# Patient Record
Sex: FEMALE | URBAN - METROPOLITAN AREA
[De-identification: names, ages, dates, MRNs, and addresses within clinical notes are randomized per-mention and may not be internally consistent; named-entity substitution may affect disease eponyms.]

---

## 2024-09-11 ENCOUNTER — TRANSCRIBE ORDERS (OUTPATIENT)
Dept: ADMINISTRATIVE | Facility: HOSPITAL | Age: 54
End: 2024-09-11

## 2024-09-11 DIAGNOSIS — E78.2 MIXED HYPERLIPIDEMIA: Primary | ICD-10-CM

## 2024-10-29 ENCOUNTER — HOSPITAL ENCOUNTER (OUTPATIENT)
Dept: CT IMAGING | Facility: HOSPITAL | Age: 54
Discharge: HOME | End: 2024-10-29

## 2024-10-29 DIAGNOSIS — E78.2 MIXED HYPERLIPIDEMIA: ICD-10-CM

## 2024-10-29 PROCEDURE — 75571 CT HRT W/O DYE W/CA TEST: CPT

## 2025-07-14 ENCOUNTER — OFFICE VISIT (OUTPATIENT)
Dept: GASTROENTEROLOGY | Facility: CLINIC | Age: 55
End: 2025-07-14
Payer: OTHER GOVERNMENT

## 2025-07-14 ENCOUNTER — LAB (OUTPATIENT)
Facility: HOSPITAL | Age: 55
End: 2025-07-14
Payer: OTHER GOVERNMENT

## 2025-07-14 VITALS
DIASTOLIC BLOOD PRESSURE: 76 MMHG | WEIGHT: 186.6 LBS | HEART RATE: 81 BPM | SYSTOLIC BLOOD PRESSURE: 114 MMHG | BODY MASS INDEX: 33.06 KG/M2 | HEIGHT: 63 IN

## 2025-07-14 DIAGNOSIS — K21.9 GASTROESOPHAGEAL REFLUX DISEASE, UNSPECIFIED WHETHER ESOPHAGITIS PRESENT: ICD-10-CM

## 2025-07-14 DIAGNOSIS — K90.0 CELIAC DISEASE: Primary | ICD-10-CM

## 2025-07-14 DIAGNOSIS — K90.0 CELIAC DISEASE: ICD-10-CM

## 2025-07-14 DIAGNOSIS — Z90.49 HISTORY OF COLON RESECTION: ICD-10-CM

## 2025-07-14 LAB
IRON 24H UR-MRATE: 89 MCG/DL (ref 37–145)
IRON SATN MFR SERPL: 22 % (ref 20–50)
TIBC SERPL-MCNC: 402 MCG/DL (ref 298–536)
TRANSFERRIN SERPL-MCNC: 270 MG/DL (ref 200–360)

## 2025-07-14 PROCEDURE — 82784 ASSAY IGA/IGD/IGG/IGM EACH: CPT

## 2025-07-14 PROCEDURE — 86364 TISS TRNSGLTMNASE EA IG CLAS: CPT

## 2025-07-14 PROCEDURE — 86258 DGP ANTIBODY EACH IG CLASS: CPT

## 2025-07-14 PROCEDURE — 83540 ASSAY OF IRON: CPT

## 2025-07-14 PROCEDURE — 36415 COLL VENOUS BLD VENIPUNCTURE: CPT

## 2025-07-14 PROCEDURE — 84466 ASSAY OF TRANSFERRIN: CPT

## 2025-07-14 RX ORDER — PANTOPRAZOLE SODIUM 40 MG/1
TABLET, DELAYED RELEASE ORAL
COMMUNITY
Start: 2020-01-01

## 2025-07-14 RX ORDER — ERGOCALCIFEROL 1.25 MG/1
1 CAPSULE, LIQUID FILLED ORAL WEEKLY
COMMUNITY
Start: 2025-04-28

## 2025-07-14 RX ORDER — DICYCLOMINE HCL 20 MG
TABLET ORAL
COMMUNITY
Start: 2025-04-21

## 2025-07-14 RX ORDER — ROSUVASTATIN CALCIUM 5 MG
TABLET ORAL
COMMUNITY
Start: 2025-04-21

## 2025-07-14 RX ORDER — CETIRIZINE HYDROCHLORIDE 10 MG/1
TABLET ORAL
COMMUNITY
Start: 2020-01-01

## 2025-07-14 RX ORDER — FAMOTIDINE 40 MG/1
1 TABLET, FILM COATED ORAL DAILY
COMMUNITY
Start: 2025-04-18

## 2025-07-14 NOTE — PROGRESS NOTES
Chief Complaint     Celiac Disease, Diverticulitis, and Heartburn    Patient or patient representative verbalized consent for the use of Ambient Listening during the visit with  ALVARO Chanel for chart documentation. 7/14/2025  13:08 EDT    History of Present Illness     History of Present Illness  The patient presents for evaluation of celiac disease, acid reflux, diverticulitis, and vitamin D deficiency.    She has been adhering to a gluten-free diet since 2004 due to her celiac disease. Despite following this diet, she has experienced malabsorption issues and is currently on a regimen of 50,000 units of vitamin D once a week. Her vitamin D levels did not increase during the last check, so her doctor adjusted the dosage. She is scheduled for a follow-up in 08/2025 to recheck her vitamin D levels. She does not take a multivitamin.    She has a history of acid reflux and underwent surgery to remove tissue from her throat that was compromising her airway. She takes pantoprazole and famotidine regularly and reports no current blood in her stool. She has consulted an ENT specialist and a gastroenterologist for her condition. She has not had a throat MRI recently. She has a history of H. pylori infection but believes it is currently resolved. She also has a hiatal hernia. She avoids gluten, salt, and fried foods. She occasionally experiences heartburn.  She does not take famotidine every night but takes 40 mg of Protonix in the morning and famotidine as needed based on her diet. She tries to avoid eating late at night. She was informed that Crestor, a cholesterol medication she started taking, could exacerbate her symptoms. Her doctor is monitoring her Crestor use and will decide in 08/2025 whether she should continue with it. Her last upper endoscopy was in 2019.    Her last colonoscopy was in 2021, following which she underwent colon surgery within a month. She has had five colonoscopies before the age of  50, none of which revealed suspicious polyps. However, she has been dealing with diverticulitis. In 01/2021, she experienced her first documented episode of diverticulitis, followed by sepsis in 04/2021. In 06/2021, she had 15 inches of her sigmoid colon removed. Despite recovering from sepsis, she was hospitalized again in 09/2021 due to another flare-up. Her most recent episode of diverticulitis occurred in 12/2023. She suspects that her celiac disease may be interfering with the effectiveness of oral antibiotics, as they often need to switch to IV treatment. She now follows a different protocol where she switches to a liquid diet at the onset of any symptoms, which seems to be helping. She reports regular bowel movements outside of these episodes. She was prescribed Bentyl, which she initially took twice daily for about two weeks, but now only takes as needed, sometimes less than once a week or even every two weeks.             History      Past Medical History:   Diagnosis Date    Celiac disease     Cholelithiasis     Diverticulitis     Diverticulitis of colon     GERD (gastroesophageal reflux disease)     Hyperlipidemia     Lactose intolerance        Past Surgical History:   Procedure Laterality Date    ABDOMINAL SURGERY  06/2021    Sigmoid Colon Resection    CHOLECYSTECTOMY  2001    COLON RESECTION      COLONOSCOPY  2015, 2018, 2021    UPPER GASTROINTESTINAL ENDOSCOPY  2001, 2019       Family History   Problem Relation Age of Onset    Stomach cancer Paternal Grandmother         Current Medications        Current Outpatient Medications:     Carboxymethylcellulose Sodium (THERATEARS EXTRA PF OP), , Disp: , Rfl:     cetirizine (zyrTEC) 10 MG tablet, , Disp: , Rfl:     Crestor 5 MG tablet, , Disp: , Rfl:     dicyclomine (BENTYL) 20 MG tablet, , Disp: , Rfl:     famotidine (PEPCID) 40 MG tablet, Take 1 tablet by mouth Daily., Disp: , Rfl:     pantoprazole (PROTONIX) 40 MG EC tablet, , Disp: , Rfl:     vitamin D  "(ERGOCALCIFEROL) 1.25 MG (40579 UT) capsule capsule, Take 1 capsule by mouth 1 (One) Time Per Week., Disp: , Rfl:      Allergies     Allergies   Allergen Reactions    Ciprofloxacin Nausea Only     Also passes out     Nsaids Other (See Comments)     Cannot take due to GI bleed     Sulfa Antibiotics Nausea Only    Zosyn [Piperacillin-Tazobactam In Dex] Unknown - High Severity     Convulsions        Social History       Social History     Social History Narrative    Not on file       Immunizations     Immunization:    There is no immunization history on file for this patient.       Objective     Objective     Vital Signs:   /76 (BP Location: Left arm, Patient Position: Sitting, Cuff Size: Adult)   Pulse 81   Ht 160 cm (63\")   Wt 84.6 kg (186 lb 9.6 oz)   BMI 33.05 kg/m²       Physical Exam    Results      Result Review :   The following data was reviewed by: ALVARO Chanel on 07/14/2025:            Results  Labs   - ALT: 02/26/2025, 29 U/L   - AST: 02/26/2025, 26 U/L   - Total Bilirubin: 02/26/2025, 0.2 mg/dL           Assessment and Plan        Assessment and Plan    Diagnoses and all orders for this visit:    1. Celiac disease (Primary)  -     Celiac Panel Reflex To Titer; Future  -     Iron Profile w/o Ferritin; Future    2. Gastroesophageal reflux disease, unspecified whether esophagitis present    3. History of colon resection        Assessment & Plan  1. Celiac Disease:  - Strict gluten-free diet since 2004.  - Order celiac panel to monitor condition.  - Check iron levels for proper absorption.    2. Gastroesophageal Reflux Disease (GERD):  - Pantoprazole 40 mg in the morning.  - Famotidine at night as needed.  - If reflux symptoms do not improve within the next month, schedule repeat upper endoscopy.    3. Diverticulitis:  - Recurrent episodes in 2021 and last episode in 12/2023.  - Follow liquid diet at onset of symptoms.    4. Vitamin D Deficiency:  - 50,000 units of vitamin D once a " week.  - Recheck vitamin D levels in 08/2025 to assess treatment effectiveness.    5. Health Maintenance:  - Last colonoscopy in 2021.  - Review previous colonoscopy results to determine timing for repeat procedure.      Records requested of previous EGD and Colonoscopy.       Follow Up        Follow Up   Return in about 6 months (around 1/14/2026) for GERD.  Patient was given instructions and counseling regarding her condition or for health maintenance advice. Please see specific information pulled into the AVS if appropriate.

## 2025-07-15 ENCOUNTER — RESULTS FOLLOW-UP (OUTPATIENT)
Dept: GASTROENTEROLOGY | Facility: CLINIC | Age: 55
End: 2025-07-15
Payer: OTHER GOVERNMENT

## 2025-07-15 ENCOUNTER — PATIENT ROUNDING (BHMG ONLY) (OUTPATIENT)
Dept: GASTROENTEROLOGY | Facility: CLINIC | Age: 55
End: 2025-07-15
Payer: OTHER GOVERNMENT

## 2025-07-15 LAB
GLIADIN PEPTIDE IGA SER-ACNC: 6 UNITS (ref 0–19)
IGA SERPL-MCNC: 111 MG/DL (ref 87–352)
TTG IGA SER-ACNC: <2 U/ML (ref 0–3)

## 2025-07-15 NOTE — PROGRESS NOTES
A My-Chart message has been sent to the patient for PATIENT ROUNDING with [facility] [place].